# Patient Record
Sex: FEMALE
[De-identification: names, ages, dates, MRNs, and addresses within clinical notes are randomized per-mention and may not be internally consistent; named-entity substitution may affect disease eponyms.]

---

## 2018-09-14 ENCOUNTER — HOSPITAL ENCOUNTER (OUTPATIENT)
Dept: HOSPITAL 61 - KCIC MAMMO | Age: 68
Discharge: HOME | End: 2018-09-14
Attending: NURSE PRACTITIONER
Payer: COMMERCIAL

## 2018-09-14 DIAGNOSIS — Z12.31: Primary | ICD-10-CM

## 2018-09-14 PROCEDURE — 77067 SCR MAMMO BI INCL CAD: CPT

## 2018-09-14 NOTE — [facility]
Bilateral digital screening mammograms:

 

Reason for examination: Routine screening.

 

Comparison is made to previous studies dated 8/24/2016 and 7/8/2015.

 

Interpretation was made with the benefit of CAD.

 

The skin and nipples show no abnormalities. No abnormal axillary lymph 

nodes are seen. The breast parenchyma shows scattered fibroglandular 

density. (Breast density: Category B.) There continue to be small 

parenchymal densities bilaterally which are stable. There are no new 

dominant masses, suspicious calcifications or architectural distortions.

 

Impression:

 

No evidence of malignancy. Recommend routine screening.

 

BI-RADS Category 2: Benign.

 

"Our facility is accredited by the [employer] 

Mammography Program."

 

This patient's information has been entered into a reminder system for the

patient to be notified with the results of her examination and a target 

date for the next mammogram.

 

 

Electronically signed by: Elma Mireles MD (9/14/2018 2:18 PM) [facility]

## 2020-01-15 ENCOUNTER — HOSPITAL ENCOUNTER (OUTPATIENT)
Dept: HOSPITAL 61 - KCIC MAMMO | Age: 70
Discharge: HOME | End: 2020-01-15
Attending: FAMILY MEDICINE
Payer: COMMERCIAL

## 2020-01-15 DIAGNOSIS — Z12.31: Primary | ICD-10-CM

## 2020-01-15 DIAGNOSIS — N64.89: ICD-10-CM

## 2020-01-15 PROCEDURE — 77067 SCR MAMMO BI INCL CAD: CPT

## 2020-01-15 NOTE — [facility]
Bilateral digital screening mammograms 

 

Reason for examination: Routine screening. History of benign breast biopsy

of the right breast.

 

Comparison is made to previous study dated September 14, 2018 and priors

 

Routine CC and MLO digital views obtained. Interpretation was made with 

the benefit of CAD.

 

The skin and nipples show no abnormalities. No abnormal lymph nodes are 

seen. The breast parenchyma is scattered fibroglandular elements. (Breast 

density: Category B.) There are no suspicious masses, suspicious 

calcifications or architectural distortions. Left outer posterior breast 

intramammary lymph node is stable. Benign calcifications are present. 

 

Within the left upper breast 4 cm from the nipple is a focal asymmetry of 

both the CC and MLO views which is new. At the right upper breast 7 cm 

from the nipple there is an asymmetry on the MLO view only new from the 

prior study.

 

Impression: Asymmetries of both breasts new from the prior studies. 

Further evaluation with bilateral diagnostic mammography with spot 

compression CC and MLO views, ML views, and bilateral breast sonography if

indicated, is advised.

 

BI-RADS Category 0: Incomplete examination.

 

"Our facility is accredited by the American [employer] 

Mammography Program."

 

This patient's information has been entered into a reminder system for the

patient to be notified with the results of her examination and a target 

date for the next mammogram.

 

Electronically signed by: Colten Olivo MD (1/15/2020 2:05 PM) 

[facility]

## 2020-01-24 ENCOUNTER — HOSPITAL ENCOUNTER (OUTPATIENT)
Dept: HOSPITAL 61 - KCIC MAMMO | Age: 70
Discharge: HOME | End: 2020-01-24
Attending: FAMILY MEDICINE
Payer: COMMERCIAL

## 2020-01-24 DIAGNOSIS — N63.21: ICD-10-CM

## 2020-01-24 DIAGNOSIS — N63.11: Primary | ICD-10-CM

## 2020-01-24 PROCEDURE — 77066 DX MAMMO INCL CAD BI: CPT

## 2020-01-24 PROCEDURE — 76641 ULTRASOUND BREAST COMPLETE: CPT

## 2020-01-24 NOTE — [facility]
Bilateral diagnostic digital mammograms:

 

Reason for examination: Nodular densities bilaterally on screening 

mammogram.

 

Comparison is made to mammographic exam dated 1/15/2020.

 

True lateral and coned compression views in CC and oblique projections 

were obtained bilaterally.

 

With these additional views, a small nodular parenchymal density persists 

at approximately the 11:30 B position of the right breast and at the 12:00

a position of the left breast. Further evaluation with ultrasound follow.

 

IMPRESSION:

 

Small nodules persist bilaterally. Ultrasound to follow.

 

BI-RADS Category 0: Incomplete. Needs additional imaging evaluation.

 

 

Bilateral breast ultrasound:

 

Ultrasound examination bilateral breast and axilla was performed.

 

In the left breast at the 12:00 position 3 cm from the nipple, there is a 

1.2 cm elongated fibrocystic lesion with no abnormal vascularity. No other

cystic or solid lesions are seen and no abnormal appearing lymph nodes are

seen in the axilla.

 

In the right breast, there is a small 6.5 mm hypoechoic fibrocystic lesion

located at the 11:30 position 5 cm from the nipple with the patient 

sitting (this corresponds to the 10:00 position 2 cm from the nipple with 

the patient supine). This would correspond with the area of mammographic 

concern. No suspicious-appearing nodules are seen. No abnormal appearing 

lymph nodes are seen in the axilla.

 

IMPRESSION:

 

Benign fibrocystic lesions bilaterally which correlate with the 

mammographic findings. Recommend 6 month follow-up with bilateral breast 

ultrasound.

 

BI-RADS Category 3:  Probably Benign.

 

"Our facility is accredited by the American [employer] 

Mammography Program."

 

This patient's information has been entered into a reminder system for the

patient to be notified with the results of her examination and a target 

date for the next mammogram.

 

Electronically signed by: Elma Mireles MD (1/24/2020 1:11 PM) [place] [id number]

## 2020-07-23 ENCOUNTER — HOSPITAL ENCOUNTER (OUTPATIENT)
Dept: HOSPITAL 61 - KCIC US | Age: 70
Discharge: HOME | End: 2020-07-23
Attending: FAMILY MEDICINE
Payer: COMMERCIAL

## 2020-07-23 DIAGNOSIS — R92.8: Primary | ICD-10-CM

## 2020-07-23 PROCEDURE — 76641 ULTRASOUND BREAST COMPLETE: CPT

## 2020-07-23 NOTE — [facility]
Bilateral breast ultrasound:

 

Reason for examination: Follow-up nodules.

 

Comparison is made to previous study dated 1/24/2020.

 

Ultrasound examination was performed bilaterally in the areas of previous 

sonographic abnormalities and at the axilla.

 

In the right breast, there continues to be a benign-appearing 4.1 mm 

fibrocystic type lesion at the 10:00 position 2 cm from the nipple which 

appears to show an interval decrease in size. No abnormal appearing lymph 

nodes are seen in the axilla.

 

In the left breast, there continues to be a 5.4 mm hypoechoic fibrocystic 

type nodule at the 12:00 position 3 cm from the nipple which has shown 

decrease in overall size. No new cystic or solid lesions are seen. No 

abnormal appearing lymph nodes are seen in the left axilla.

 

IMPRESSION:

 

Benign-appearing fibrocystic lesions bilaterally which appear to have 

shown interval improvement. Recommend routine mammographic follow-up.

 

BI-RADS Category 2:  Benign.

 

"Our facility is accredited by the American [employer] 

Mammography Program."

 

This patient's information has been entered into a reminder system for the

patient to be notified with the results of her examination and a target 

date for the next mammogram.

 

Electronically signed by: Elma Mireles MD (7/23/2020 6:27 PM) [email or web address]

## 2020-09-17 ENCOUNTER — HOSPITAL ENCOUNTER (OUTPATIENT)
Dept: HOSPITAL 61 - KCIC DEXA | Age: 70
Discharge: HOME | End: 2020-09-17
Attending: FAMILY MEDICINE
Payer: COMMERCIAL

## 2020-09-17 DIAGNOSIS — Z78.0: Primary | ICD-10-CM

## 2020-09-17 PROCEDURE — 77080 DXA BONE DENSITY AXIAL: CPT

## 2020-09-17 NOTE — [facility]
EXAM: DUAL ENERGY X-RAY ABSORPTIOMETRY (DEXA).

 

HISTORY: Postmenopausal screening.

 

FINDINGS: The lowest measured T-score is -0.4 in the left hip, based on a 

bone mineral density of 0.889 g/cm^2. Refer to the worksheets for full 

detail.

 

There has been a 7.1 percent decrease in density of the left hip and 5.0 

percent increase in density of the lumbar spine compared to a study dated 

9/5/2009.

 

IMPRESSION: Normal. Bone mineral density yields a T-score of -1.0 or 

greater. Fracture risk is low.

 

 

METHODOLOGY: Dual energy x-ray absorptiometry was performed to measure 

bone mineral density. The following analysis is based on the 2019 Official

Positions of the International Society for Clinical Densitometry: 

 

Measurements of the hips and the average of L1-L4 are preferred. When the 

spine and/or hip cannot be feasibly measured or interpreted, or in the 

setting of hyperparathyroidism, distal radial bone mineral density may be 

measured. 

 

The lumbar spine T-score is based on the average bone mineral density of 

L1-L4. In the setting of artifact or anatomic abnormality, some lumbar 

levels may be excluded, and the remaining levels used for calculation. A 

single lumbar level is not used for diagnosis, and if only a single level 

is available for assessment, another anatomic site will be used to assign 

a diagnosis.

 

The hip T-score is based on the bone mineral density measurement of the 

femoral neck or total proximal femur of either side, whichever is lowest. 

Bilateral mean values are not used for diagnosis.

 

The forearm T-score is derived from 33% of the distal radius of the 

nondominant forearm.

 

Electronically signed by: Yarely Kennedy MD (9/17/2020 3:22 PM) [id number]

## 2022-05-21 ENCOUNTER — HOSPITAL ENCOUNTER (EMERGENCY)
Dept: HOSPITAL 61 - ER | Age: 72
Discharge: HOME | End: 2022-05-21
Payer: COMMERCIAL

## 2022-05-21 VITALS — WEIGHT: 222.45 LBS | BODY MASS INDEX: 39.41 KG/M2 | HEIGHT: 63 IN

## 2022-05-21 VITALS — DIASTOLIC BLOOD PRESSURE: 78 MMHG | SYSTOLIC BLOOD PRESSURE: 164 MMHG

## 2022-05-21 DIAGNOSIS — B34.9: ICD-10-CM

## 2022-05-21 DIAGNOSIS — J45.909: ICD-10-CM

## 2022-05-21 DIAGNOSIS — U07.1: Primary | ICD-10-CM

## 2022-05-21 PROCEDURE — 87426 SARSCOV CORONAVIRUS AG IA: CPT

## 2022-05-21 PROCEDURE — 99284 EMERGENCY DEPT VISIT MOD MDM: CPT

## 2022-05-21 PROCEDURE — 71045 X-RAY EXAM CHEST 1 VIEW: CPT

## 2022-05-21 NOTE — RAD
Exam: Chest one view



INDICATION: Cough, fever



TECHNIQUE: Frontal view of the chest



Comparisons: None



FINDINGS:

The cardiomediastinal silhouette and pulmonary vessels are within normal limits.



The lung and pleural spaces are clear.



IMPRESSION:

No acute cardiopulmonary process.



Electronically signed by: Eunice Lentz MD (5/21/2022 9:50 PM) [facility]

## 2022-05-21 NOTE — PHYS DOC
Past Medical History


Past Medical History:  Asthma


Past Surgical History:  Hysterectomy


Smoking Status:  Never Smoker


Alcohol Use:  None


Drug Use:  None





General Adult


EDM:


Chief Complaint:  FLU SYMPTOM





HPI:


HPI:


72-year-old female, past medical history asthma, allergies, hysterectomy, 

presents with 3 days of fever, chills, cough, 1 episode of nausea and vomiting, 

decreased appetite.  Patient states that she was traveling recently and at an e

vent with lots of other people.  Denies diarrhea or abdominal pain.  Denies 

chest pain, shortness of breath or chest tightness.





Review of Systems:


Review of Systems:


Constitutional:   + fever or chills. []


Eyes:   Denies change in visual acuity. []


HENT:   Denies nasal congestion or sore throat. [] 


Respiratory:   + cough, no shortness of breath. [] 


Cardiovascular:   Denies chest pain or edema. [] 


GI:   Denies abdominal pain, + nausea, vomiting (resolved), no bloody stools or 

diarrhea. [] 


:  Denies dysuria. [] 


Musculoskeletal:   Denies back pain or joint pain. [] 


Integument:   Denies rash. [] 


Neurologic:   Denies headache, focal weakness or sensory changes. [] 


Endocrine:   Denies polyuria or polydipsia. [] 


Lymphatic:  Denies swollen glands. [] 


Psychiatric:  Denies depression or anxiety. []





Heart Score:


C/O Chest Pain:  No


Risk Factors:


Risk Factors:  DM, Current or recent (<one month) smoker, HTN, HLP, family 

history of CAD, obesity.


Risk Scores:


Score 0 - 3:  2.5% MACE over next 6 weeks - Discharge Home


Score 4 - 6:  20.3% MACE over next 6 weeks - Admit for Clinical Observation


Score 7 - 10:  72.7% MACE over next 6 weeks - Early Invasive Strategies





Current Medications:





Current Medications








 Medications


  (Trade)  Dose


 Ordered  Sig/Felecia  Start Time


 Stop Time Status Last Admin


Dose Admin


 


 Acetaminophen


  (Tylenol)  650 mg  1X  ONCE  5/21/22 20:45


 5/21/22 20:46   














Allergies:


Allergies:





Allergies








Coded Allergies Type Severity Reaction Last Updated Verified


 


  No Known Drug Allergies    5/21/22 No











Physical Exam:


PE:





Constitutional: Obese, Well developed, well nourished, no acute distress, 

non-toxic appearance. []


HENT: Normocephalic, atraumatic, bilateral external ears normal, oropharynx 

moist, no oral exudates, nose normal. []


Eyes: PERRLA, EOMI, conjunctiva normal, no discharge. [] 


Neck: Normal range of motion, no tenderness, supple, no stridor. [] 


Cardiovascular:Heart rate regular rhythm, no murmur []


Lungs & Thorax:  Bilateral breath sounds clear to auscultation []


Abdomen: Bowel sounds normal, soft, no tenderness, no masses, no pulsatile 

masses. [] 


Skin: Warm, dry, no erythema, no rash. [] 


Back: No tenderness, no CVA tenderness. [] 


Extremities: No tenderness, no cyanosis, no clubbing, ROM intact, no edema. [] 


Neurologic: Alert and oriented X 3, normal motor function, normal sensory 

function, no focal deficits noted. []


Psychologic: Affect normal, judgement normal, mood normal. []





Current Patient Data:


Vital Signs:





                                   Vital Signs








  Date Time  Temp Pulse Resp B/P (MAP) Pulse Ox O2 Delivery O2 Flow Rate FiO2


 


5/21/22 20:12 101.3 96 20 110/53 (72) 97 Room Air  





 101.3       











EKG:


EKG:


[]





Radiology/Procedures:


Radiology/Procedures:


[]





Course & Med Decision Making:


Course & Med Decision Making


Pertinent Labs and Imaging studies reviewed. (See chart for details)





Additional Social History: PMD from non-affiliated facility. Patient Lives at 

home. Family History: Non-pertinent to today's complaint.





Nursing Notes Reviewed


Previous Medical Records requested via [facility]: Reviewed by me.


________________________________________





EMERGENT LABS AND DIAGNOSTIC STUDIES:





Results were reviewed and interpreted by me as below





PROCEDURE: PORTABLE CHEST 1V


IMPRESSION:


No acute cardiopulmonary process.


________________________________________





EMERGENCY DEPARTMENT COURSE/ MEDICAL DECISION MAKING:





The patient was placed on a cardiac monitor, continuous pulse oximetry and was 

given supplemental oxygen.





I examined the patient, evaluated and addressed patient's chief complaint.





The patient was treated with tylenol





r/o covid, pneumonia, likely viral syndrome.





Found to be COVID positive. Defervesced with tylenol and vitals normalized. Well

 appearing. Chest xray neg.





The patient understands that todays Emergency Department evaluation does not 

represent a comprehensive medical workup, and it is impossible to diagnose all 

possible illnesses from a single Emergency Department visit.


The patient verbalized understanding that it is absolutely necessary to have 

follow-up with regular primary care physician within 1-2 days for more detailed 

workup and continued exam. I explained the findings and plan to the patient, who

 expressed verbal understanding and agreed with plan for discharge and follow 

up. The patient was given after care instructions and welcomed to return to the 

ED for re-evaluation in 8-12 hours, especially for any new or worsening 

symptoms.


Patient's blood pressure was elevated (>120/80) but appears stable without 

evidence of end organ damage, malignant hypertension, hypertensive emergency or 

urgency.  The patient was counseled about the risks of hypertension and urged to

 pursue outpatient monitoring and therapy within a week with their primary care 

physician.





The patient was stable at the time of discharge with tylenol/ibuprofen, tessalon

 perles as needed and routine pmd f/u. Strict ED return precautions.








DIAGNOSTIC IMPRESSION:





1. COVID


2. viral syndrome


3. cough











DISPOSITION:





Disposition: Discharge Home.





Condition: Improved





Follow-Up: PMD





Prescriptions: ibuprofen, tylenol, tessalon perles





Return to the Emergency Department for new or worsening symptoms.





Dragon Disclaimer:


Dragon Disclaimer:


This electronic medical record was generated, in whole or in part, using a voice

 recognition dictation system.





Departure


Departure


Impression:  


   Primary Impression:  


   COVID-19


   Additional Impressions:  


   Cough


   Viral syndrome


Disposition:  01 HOME / SELF CARE / HOMELESS


Condition:  STABLE


Referrals:  


CICI SCOTT DO (PCP)


Scripts


Acetaminophen (TYLENOL) 325 Mg Tablet


1-2 TAB PO QID, #60 TAB 2 Refills


   Prov: ISELA GARCIA MD         5/21/22 


Ibuprofen (IBUPROFEN) 600 Mg Tablet


600 MG PO PRN Q6HRS PRN for INFLAMMATION, #20 TAB


   Prov: ISELA GARCIA MD         5/21/22 


Benzonatate (BENZONATATE) 200 Mg Capsule


1 CAP PO PRN TID PRN for cough for 7 Days, #21 CAP 0 Refills


   Prov: ISELA GARCIA MD         5/21/22











ISELA GARCIA MD                   May 21, 2022 20:47